# Patient Record
(demographics unavailable — no encounter records)

---

## 2024-10-08 NOTE — PHYSICAL EXAM
[Normal Mood and Affect] : normal mood and affect [Well Developed] : well developed [Well Nourished] : well nourished [NL (90)] : forward flexion 90 degrees [NL (30)] : right lateral bending 30 degrees [Disc space narrowing] : Disc space narrowing [NL (120)] : flexion 120 degrees [NL (45)] : external rotation 45 degrees [5___] : abduction 5[unfilled]/5 [Right] : right hip with pelvis [AP] : anteroposterior [Lateral] : lateral [Moderate arthritis (Tonnis Grade 2)] : Moderate arthritis (Tonnis Grade 2) [Extension] : extension [Facet arthropathy] : Facet arthropathy [Spondylolithesis] : Spondylolithesis [de-identified] : overweight [FreeTextEntry1] : Slight rightward deviation.  Old slight compression fracture L2 superior endplate.  Mild DDD. Spondylolisthesis L4-5. [] : no greater trochanteric tenderness [FreeTextEntry9] : Merna femoral head, dysplastic.  Mild joint space narrowing. [de-identified] : adduction 30 degrees [de-identified] : abduction 35 degrees [TWNoteComboBox6] : internal rotation 30 degrees

## 2024-10-08 NOTE — REASON FOR VISIT
[FreeTextEntry2] : f/u lower back. States that she is uncomfortable all the time. Walking too much or sitting for too long bothers her.

## 2024-10-08 NOTE — HISTORY OF PRESENT ILLNESS
[2] : 2 [Sharp] : sharp [Shooting] : shooting [Constant] : constant [Rest] : rest [Heat] : heat [Sitting] : sitting [Full time] : Work status: full time [de-identified] : 10/08/24:  Return visit for a 64 year old female here today for right sided lower back pain that she has been living with.  Occasional pain into her thigh, has known OA of the right hip.  Walking or sitting for too long causes pain.  She has to change positions.  Is taking Feldene once a day without relief of back pain.  Pain scale at a 4.  11/18/22  Initial visit for this 63 year female c/o spontaneous onset of lateral rt hip pain radiates to rt LB x last 6 months duration. Constant and daily. Worse arising from a sitting position and sitting in a car. Occasional wake up pain at night. pain is a "2-6". Has tried Motrin 400-600 mg prn w/o relief.  PMH: Hx of CHD rt hip had surgery age at 3 yo.           Had x-rays 10 years ago at [place] where DJD was noted. has occasional episodes of locking.          Hx left renal carcinoma. 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

## 2024-11-16 NOTE — HISTORY OF PRESENT ILLNESS
[Lower back] : lower back [Right Leg] : right leg [3] : 3 [1] : 2 [Dull/Aching] : dull/aching [Radiating] : radiating [Sharp] : sharp [Shooting] : shooting [Stabbing] : stabbing [Throbbing] : throbbing [Constant] : constant [Household chores] : household chores [Leisure] : leisure [Sleep] : sleep [Rest] : rest [Meds] : meds [Heat] : heat [Physical therapy] : physical therapy [] : no [FreeTextEntry1] : R hip  [FreeTextEntry7] : R leg  [FreeTextEntry9] : Tramadol- Acepromazine, [de-identified] : x-ray and MRI

## 2024-11-16 NOTE — DISCUSSION/SUMMARY
[de-identified] : CAMRYN GAMBOA is a 65 year-old woman presenting for a NPV for a history of chronic right hip and low back pain.   Prior treatment: Piroxicam Oral steroid taper  Plan: 1) MRI lumbar spine images reviewed with the patient. 2) Schedule L4-L5 ILESI w/o MAC. The procedure was explained to the patient in detail. Reviewed risks, benefits, and alternatives with the patient. Some risks discussed included temporary increase in pain, bleeding, infection, and side effects from steroids. The patient expressed understanding and would like to proceed. 3) Continue piroxicam 20mg daily; denies AEs 4) Discussed a variety of exercises and regular stretches and printed out various extension/flexion exercises and core strengthening exercises. Patient will start a daily stretching routine and eventually resume cardio workout. Also discussed the importance of stress reduction and good sleep hygiene. 5) RTC post procedure

## 2024-11-16 NOTE — PHYSICAL EXAM
[de-identified] : Gen: NAD Head: NC/AT Eyes: no glasses, no scleral icterus ENT: mucous membranes moist CV: No JVD Lungs: nonlabored breathing Abd: soft, NT/ND Ext: full ROM in all extremities, no peripheral edema Back: +TTP in the right low lumbar facet region, +SLR on the RIGHT Neuro: CN intact LEs +5 L +5 R hip flexion +5 L +5 R leg extension +5 L +5 R leg flexion +5 L +5 R foot dorsiflexion +5 L +5 R foot plantarflexion +5 L +5 R EHL extension Psych: normal affect Skin: no visible lesions

## 2024-11-16 NOTE — HISTORY OF PRESENT ILLNESS
[Lower back] : lower back [Right Leg] : right leg [3] : 3 [1] : 2 [Dull/Aching] : dull/aching [Radiating] : radiating [Sharp] : sharp [Shooting] : shooting [Stabbing] : stabbing [Throbbing] : throbbing [Constant] : constant [Household chores] : household chores [Leisure] : leisure [Sleep] : sleep [Rest] : rest [Meds] : meds [Heat] : heat [Physical therapy] : physical therapy [] : no [FreeTextEntry1] : R hip  [FreeTextEntry7] : R leg  [FreeTextEntry9] : Tramadol- Acepromazine, [de-identified] : x-ray and MRI

## 2024-11-16 NOTE — PHYSICAL EXAM
[de-identified] : Gen: NAD Head: NC/AT Eyes: no glasses, no scleral icterus ENT: mucous membranes moist CV: No JVD Lungs: nonlabored breathing Abd: soft, NT/ND Ext: full ROM in all extremities, no peripheral edema Back: +TTP in the right low lumbar facet region, +SLR on the RIGHT Neuro: CN intact LEs +5 L +5 R hip flexion +5 L +5 R leg extension +5 L +5 R leg flexion +5 L +5 R foot dorsiflexion +5 L +5 R foot plantarflexion +5 L +5 R EHL extension Psych: normal affect Skin: no visible lesions

## 2024-11-16 NOTE — DATA REVIEWED
[MRI] : MRI [Lumbar Spine] : lumbar spine [Report was reviewed and noted in the chart] : The report was reviewed and noted in the chart [I independently reviewed and interpreted images and report] : I independently reviewed and interpreted images and report [FreeTextEntry1] : 10/23/2024 MRI Lumbar Spine ([facility]) Findings: Five lumbar type vertebral bodies are presumed for the purposes of this report with the last formed intervertebral disc considered L5-S1. Lordosis is preserved without subluxation. There is a mild chronic compression deformity at L2. The remaining vertebral bodies are normal in height without evidence for fracture or destructive osseous lesion. Multilevel degenerative disc disease is appreciated with multilevel disc height loss and desiccation. There is no pars defect. The conus resides at L1. The patient is status post partial left nephrectomy. A right renal cyst is appreciated.  Axial levels:  T12-L1: There is no disc herniation, central canal stenosis, or neural foraminal narrowing.  L1-L2: There is a diffuse disc bulge and bilateral facet arthrosis. There is no neural foraminal narrowing or central canal stenosis.  L2-L3: There is no disc herniation, central canal stenosis, or neural foraminal narrowing.  L3-L4: There is no disc herniation, central canal stenosis, or neural foraminal narrowing.  L4-L5: There is a diffuse disc bulge and bilateral facet arthrosis. There is a small central disc protrusion. There is mild neural foraminal narrowing and mild central canal stenosis.  L5-S1: There is no disc herniation, central canal stenosis, or neural foraminal narrowing.  IMPRESSION: * Mild chronic compression deformity at L2. * Multilevel degenerative disc disease and facet arthrosis without nerve root encroachment or significant central canal stenosis.

## 2024-11-16 NOTE — DISCUSSION/SUMMARY
[de-identified] : CAMRYN GAMBOA is a 65 year-old woman presenting for a NPV for a history of chronic right hip and low back pain.   Prior treatment: Piroxicam Oral steroid taper  Plan: 1) MRI lumbar spine images reviewed with the patient. 2) Schedule L4-L5 ILESI w/o MAC. The procedure was explained to the patient in detail. Reviewed risks, benefits, and alternatives with the patient. Some risks discussed included temporary increase in pain, bleeding, infection, and side effects from steroids. The patient expressed understanding and would like to proceed. 3) Continue piroxicam 20mg daily; denies AEs 4) Discussed a variety of exercises and regular stretches and printed out various extension/flexion exercises and core strengthening exercises. Patient will start a daily stretching routine and eventually resume cardio workout. Also discussed the importance of stress reduction and good sleep hygiene. 5) RTC post procedure

## 2024-12-12 NOTE — PROCEDURE
[FreeTextEntry1] : L4-L5 lumbar interlaminar epidural steroid injection [FreeTextEntry2] : chronic lumbar radiculopathy [FreeTextEntry3] : Procedure Date: 12/12/2024   Preoperative Diagnosis: chronic low back pain with bilateral sciatica   Procedure: L4-L5 epidural steroid injection under fluoroscopic guidance   Anesthesia: local   Complications: none   EBL: none   Procedure in detail: Patient was seen and examined. Risks, benefits, and alternatives for the procedure were discussed with the patient in detail. The patient expressed understanding, gave written and verbal consent, and placed themselves in a prone position on the procedure table. Skin overlying the lumbosacral spine was prepped with chloraprep and draped in the usual sterile fashion. Fluoroscopic images were obtained to identify the L4 and L5 vertebral body. Target was the interlaminar space between the L4 and L5 vertebral body. Skin overlying the target was marked and infiltrated with 1% lidocaine. Using an 20 gauge, 9cm Tuohy needle, this was inserted and advanced under intermittent fluoroscopic guidance. When felt to be engaged in the epidural space, lateral view was used to confirm depth. Needle was advanced until loss of resistance to saline was achieved. After negative aspiration for heme/csf, contrast was injected under live fluoroscopy, which showed contrast spread consistent with epidural flow. No evidence of intravascular or intrathecal uptake. At this point, a total of 3ml of injectate, which consisted of 1ml of 80mg/ml depomedrol and 2ml of normal saline were injected. The needle was restyletted and withdrawn, a band aid was placed over the injection site. Patient tolerated the procedure well. The patient recovered uneventfully and was discharged home in stable condition.

## 2025-01-08 NOTE — PHYSICAL EXAM
[de-identified] : Gen: NAD Head: NC/AT Eyes: no glasses, no scleral icterus ENT: mucous membranes moist CV: No JVD Lungs: nonlabored breathing Abd: soft, NT/ND Ext: RLE: +TTP over the greater trochanter  Back: +TTP in the right low lumbar facet region, +SLR on the RIGHT Neuro: CN intact LEs +5 L +5 R hip flexion +5 L +5 R leg extension +5 L +5 R leg flexion +5 L +5 R foot dorsiflexion +5 L +5 R foot plantarflexion +5 L +5 R EHL extension Psych: normal affect Skin: no visible lesions

## 2025-01-08 NOTE — PHYSICAL EXAM
[de-identified] : Gen: NAD Head: NC/AT Eyes: no glasses, no scleral icterus ENT: mucous membranes moist CV: No JVD Lungs: nonlabored breathing Abd: soft, NT/ND Ext: RLE: +TTP over the greater trochanter  Back: +TTP in the right low lumbar facet region, +SLR on the RIGHT Neuro: CN intact LEs +5 L +5 R hip flexion +5 L +5 R leg extension +5 L +5 R leg flexion +5 L +5 R foot dorsiflexion +5 L +5 R foot plantarflexion +5 L +5 R EHL extension Psych: normal affect Skin: no visible lesions

## 2025-01-08 NOTE — DISCUSSION/SUMMARY
[de-identified] : CAMRYN GAMBOA is a 65 year-old woman presenting for a RPV for a history of chronic right hip and low back pain.   Prior treatment: 12/12/2024 L4-L5 ILESI w/o MAC w/ 80% improvement of pain and function  Piroxicam Oral steroid taper  Plan: 1) MRI lumbar spine images reviewed with the patient. 2) Consider repeat L4-L5 ILESI w/o MAC in the future 3) Continue piroxicam 20mg daily; denies AEs 4) RIGHT GTB injection today. The procedure was explained to the patient in detail. Reviewed risks, benefits, and alternatives with the patient. Some risks discussed included temporary increase in pain, bleeding, infection, and side effects from steroids. The patient expressed understanding and would like to proceed. 5) RTC 2 months

## 2025-01-08 NOTE — PROCEDURE
[Large Joint Injection] : Large joint injection [Right] : of the right [Greater Trochanteric Bursa] : greater trochanteric bursa [Pain] : pain [Alcohol] : alcohol [Ethyl Chloride sprayed topically] : ethyl chloride sprayed topically [___ cc    0.25%] : Bupivacaine (Marcaine) ~Vcc of 0.25%  [___ cc    40mg] : Triamcinolone (Kenalog) ~Vcc of 40 mg  [] : Patient tolerated procedure well [Risks, benefits, alternatives discussed / Verbal consent obtained] : the risks benefits, and alternatives have been discussed, and verbal consent was obtained

## 2025-01-08 NOTE — HISTORY OF PRESENT ILLNESS
[Lower back] : lower back [Right Leg] : right leg [3] : 3 [Dull/Aching] : dull/aching [Radiating] : radiating [Sharp] : sharp [Shooting] : shooting [Stabbing] : stabbing [Throbbing] : throbbing [Constant] : constant [Household chores] : household chores [Leisure] : leisure [Sleep] : sleep [Rest] : rest [Meds] : meds [Heat] : heat [Physical therapy] : physical therapy [1] : 2 [] : no [FreeTextEntry1] : R hip  [FreeTextEntry7] : R leg  [FreeTextEntry9] : Tramadol- Acepromazine, [de-identified] : x-ray and MRI [TWNoteComboBox1] : 80%

## 2025-01-08 NOTE — DISCUSSION/SUMMARY
[de-identified] : CAMRYN GAMBOA is a 65 year-old woman presenting for a RPV for a history of chronic right hip and low back pain.   Prior treatment: 12/12/2024 L4-L5 ILESI w/o MAC w/ 80% improvement of pain and function  Piroxicam Oral steroid taper  Plan: 1) MRI lumbar spine images reviewed with the patient. 2) Consider repeat L4-L5 ILESI w/o MAC in the future 3) Continue piroxicam 20mg daily; denies AEs 4) RIGHT GTB injection today. The procedure was explained to the patient in detail. Reviewed risks, benefits, and alternatives with the patient. Some risks discussed included temporary increase in pain, bleeding, infection, and side effects from steroids. The patient expressed understanding and would like to proceed. 5) RTC 2 months

## 2025-01-08 NOTE — HISTORY OF PRESENT ILLNESS
[Lower back] : lower back [Right Leg] : right leg [3] : 3 [Dull/Aching] : dull/aching [Radiating] : radiating [Sharp] : sharp [Shooting] : shooting [Stabbing] : stabbing [Throbbing] : throbbing [Constant] : constant [Household chores] : household chores [Leisure] : leisure [Sleep] : sleep [Rest] : rest [Meds] : meds [Heat] : heat [Physical therapy] : physical therapy [1] : 2 [] : no [FreeTextEntry1] : R hip  [FreeTextEntry7] : R leg  [FreeTextEntry9] : Tramadol- Acepromazine, [de-identified] : x-ray and MRI [TWNoteComboBox1] : 80%

## 2025-01-25 NOTE — ASSESSMENT
[FreeTextEntry1] : Patient presents for followup of multiple complex gastrointestinal signs and symptoms.   Multiple reports were reviewed with patient including reports of colonoscopy, endoscopy and histopathology.   New prescriptions sent to pharmacy for glycopyrrolate and rabeprazole.   Moderate degree of complexity of medical decision making involved in this patient encounter

## 2025-01-25 NOTE — HISTORY OF PRESENT ILLNESS
[FreeTextEntry1] : Chief complaint: abdominal pain, gerd   HPI Patient presents for followup of multiple complex gastrointestinal signs and symptoms. Complex syndrome of abdominal pain with intermittent crampy llq abdominal pain. There are no exacerbating or ameliorating factors. The abdominal pain is non radiating; associated factors include nausea.   No sign of acute gastrointestinal bleeding such as hematemesis, melena or hematochezia no dysphagia or odynophagia.   Will increase dose of glycopyrrolate to one mg tid  Prescription to be sent for rabeprazole 20 mg po qd Moderate degree of complexity of medical decision making involved in this patient encounter

## 2025-01-25 NOTE — PHYSICAL EXAM

## 2025-03-11 NOTE — PROCEDURE
[Large Joint Injection] : Large joint injection [Right] : of the right [Greater Trochanteric Bursa] : greater trochanteric bursa [Pain] : pain [Alcohol] : alcohol [Ethyl Chloride sprayed topically] : ethyl chloride sprayed topically [___ cc    0.25%] : Bupivacaine (Marcaine) ~Vcc of 0.25%  [___ cc    40mg] : Triamcinolone (Kenalog) ~Vcc of 40 mg  [Risks, benefits, alternatives discussed / Verbal consent obtained] : the risks benefits, and alternatives have been discussed, and verbal consent was obtained [] : Patient tolerated procedure well

## 2025-03-11 NOTE — REASON FOR VISIT
Anesthesia Evaluation     Patient summary reviewed and Nursing notes reviewed   no history of anesthetic complications:  NPO Solid Status: > 8 hours  NPO Liquid Status: > 2 hours           Airway   Mallampati: III  TM distance: >3 FB  Neck ROM: full  Possible difficult intubation  Dental      Comment: A FEW MISSING TEETH    Pulmonary    (+) asthma,decreased breath sounds,   Cardiovascular   Exercise tolerance: good (4-7 METS)    ECG reviewed  Rhythm: regular  Rate: normal    (+) hyperlipidemia,       Neuro/Psych  GI/Hepatic/Renal/Endo    (+) obesity,   diabetes mellitus type 2 well controlled,     Musculoskeletal     Abdominal   (+) obese,     Abdomen: soft.   Substance History      OB/GYN          Other   arthritis,                      Anesthesia Plan    ASA 3     general     intravenous induction     Anesthetic plan, all risks, benefits, and alternatives have been provided, discussed and informed consent has been obtained with: patient.    Plan discussed with CRNA.      
[Follow-Up Visit] : a follow-up pain management visit
[Follow-Up Visit] : a follow-up pain management visit

## 2025-03-12 NOTE — HISTORY OF PRESENT ILLNESS
[Lower back] : lower back [Right Leg] : right leg [1] : 2 [Dull/Aching] : dull/aching [Radiating] : radiating [Sharp] : sharp [Shooting] : shooting [Stabbing] : stabbing [Throbbing] : throbbing [Constant] : constant [Household chores] : household chores [Leisure] : leisure [Sleep] : sleep [Rest] : rest [Meds] : meds [Heat] : heat [Physical therapy] : physical therapy [8] : 8 [5] : 5 [] : no [FreeTextEntry1] : R hip  [FreeTextEntry7] : R leg  [FreeTextEntry9] : Tramadol- Acepromazine, [de-identified] : x-ray and MRI [TWNoteComboBox1] : 80%

## 2025-03-12 NOTE — DISCUSSION/SUMMARY
[de-identified] : CAMRYN GAMBOA is a 65 year-old woman presenting for a RPV for a history of chronic right hip and low back pain.   Prior treatment: 12/12/2024 L4-L5 ILESI w/o MAC w/ 80% improvement of pain and function  Piroxicam Oral steroid taper  Plan: 1) MRI lumbar spine images reviewed with the patient. 2) Consider repeat L4-L5 ILESI w/o MAC in the future 3) Continue piroxicam 20mg daily; denies AEs 4) X-ray right knee images reviewed with the patient - patient with mild degenerative changes, OA in right hip is much worse 5) Schedule CSI right hip under x-ray guidance w/o sedation. The procedure was explained to the patient in detail. Reviewed risks, benefits, and alternatives with the patient. Some risks discussed included temporary increase in pain, bleeding, infection, and side effects from steroids. The patient expressed understanding and would like to proceed. 6) RTC post procedure

## 2025-03-12 NOTE — PHYSICAL EXAM
[de-identified] : Gen: NAD Head: NC/AT Eyes: no glasses, no scleral icterus ENT: mucous membranes moist CV: No JVD Lungs: nonlabored breathing Abd: soft, NT/ND Ext: RLE: limited internal/external hip rotation  Back: +TTP in the right low lumbar facet region, +SLR on the RIGHT Neuro: CN intact LEs +5 L +5 R hip flexion +5 L +5 R leg extension +5 L +5 R leg flexion +5 L +5 R foot dorsiflexion +5 L +5 R foot plantarflexion +5 L +5 R EHL extension Psych: normal affect Skin: no visible lesions

## 2025-03-12 NOTE — HISTORY OF PRESENT ILLNESS
[Lower back] : lower back [Right Leg] : right leg [1] : 2 [Dull/Aching] : dull/aching [Radiating] : radiating [Sharp] : sharp [Shooting] : shooting [Stabbing] : stabbing [Throbbing] : throbbing [Constant] : constant [Household chores] : household chores [Leisure] : leisure [Sleep] : sleep [Rest] : rest [Meds] : meds [Heat] : heat [Physical therapy] : physical therapy [8] : 8 [5] : 5 [] : no [FreeTextEntry1] : R hip  [FreeTextEntry7] : R leg  [FreeTextEntry9] : Tramadol- Acepromazine, [de-identified] : x-ray and MRI [TWNoteComboBox1] : 80%

## 2025-03-12 NOTE — DISCUSSION/SUMMARY
[de-identified] : CAMRYN GAMBOA is a 65 year-old woman presenting for a RPV for a history of chronic right hip and low back pain.   Prior treatment: 12/12/2024 L4-L5 ILESI w/o MAC w/ 80% improvement of pain and function  Piroxicam Oral steroid taper  Plan: 1) MRI lumbar spine images reviewed with the patient. 2) Consider repeat L4-L5 ILESI w/o MAC in the future 3) Continue piroxicam 20mg daily; denies AEs 4) X-ray right knee images reviewed with the patient - patient with mild degenerative changes, OA in right hip is much worse 5) Schedule CSI right hip under x-ray guidance w/o sedation. The procedure was explained to the patient in detail. Reviewed risks, benefits, and alternatives with the patient. Some risks discussed included temporary increase in pain, bleeding, infection, and side effects from steroids. The patient expressed understanding and would like to proceed. 6) RTC post procedure

## 2025-03-12 NOTE — PHYSICAL EXAM
[de-identified] : Gen: NAD Head: NC/AT Eyes: no glasses, no scleral icterus ENT: mucous membranes moist CV: No JVD Lungs: nonlabored breathing Abd: soft, NT/ND Ext: RLE: limited internal/external hip rotation  Back: +TTP in the right low lumbar facet region, +SLR on the RIGHT Neuro: CN intact LEs +5 L +5 R hip flexion +5 L +5 R leg extension +5 L +5 R leg flexion +5 L +5 R foot dorsiflexion +5 L +5 R foot plantarflexion +5 L +5 R EHL extension Psych: normal affect Skin: no visible lesions

## 2025-04-23 NOTE — HISTORY OF PRESENT ILLNESS
[Lower back] : lower back [Right Leg] : right leg [8] : 8 [5] : 5 [Dull/Aching] : dull/aching [Radiating] : radiating [Sharp] : sharp [Shooting] : shooting [Stabbing] : stabbing [Throbbing] : throbbing [Constant] : constant [Household chores] : household chores [Leisure] : leisure [Sleep] : sleep [Rest] : rest [Meds] : meds [Heat] : heat [Physical therapy] : physical therapy [3] : 3 [] : no [FreeTextEntry7] : R leg  [FreeTextEntry1] : R hip  [FreeTextEntry9] : Tramadol- Acepromazine, [de-identified] : x-ray and MRI [TWNoteComboBox1] : 20%

## 2025-04-23 NOTE — PROCEDURE
[Large Joint Injection] : Large joint injection [Right] : of the right [Greater Trochanteric Bursa] : greater trochanteric bursa [Pain] : pain [Alcohol] : alcohol [Ethyl Chloride sprayed topically] : ethyl chloride sprayed topically [___ cc    0.25%] : Bupivacaine (Marcaine) ~Vcc of 0.25%  [___ cc    40mg] : Triamcinolone (Kenalog) ~Vcc of 40 mg

## 2025-04-23 NOTE — DISCUSSION/SUMMARY
[de-identified] : CAMRYN GAMBOA is a 65 year-old woman presenting for a RPV for a history of chronic right hip and low back pain.   Prior treatment: 04/04/2025 RIGHT IA HIP injection w/o MAC 12/12/2024 L4-L5 ILESI w/o MAC w/ 80% improvement of pain and function  Piroxicam Oral steroid taper  Plan: 1) MRI lumbar spine images reviewed with the patient. 2) Consider repeat L4-L5 ILESI w/o MAC in the future 3) Continue piroxicam 20mg daily; denies AEs 4) X-ray right knee images reviewed with the patient - patient with mild degenerative changes, OA in right hip is much worse 5) RIGHT GTB injection. The procedure was explained to the patient in detail. Reviewed risks, benefits, and alternatives with the patient. Some risks discussed included temporary increase in pain, bleeding, infection, and side effects from steroids. The patient expressed understanding and would like to proceed. 6) Obtain MRI right thigh w/o contrast  7) Initiate physical therapy - script provided 6) RTC 6 weeks

## 2025-04-23 NOTE — HISTORY OF PRESENT ILLNESS
[Lower back] : lower back [Right Leg] : right leg [8] : 8 [5] : 5 [Dull/Aching] : dull/aching [Radiating] : radiating [Sharp] : sharp [Shooting] : shooting [Stabbing] : stabbing [Throbbing] : throbbing [Constant] : constant [Household chores] : household chores [Leisure] : leisure [Sleep] : sleep [Rest] : rest [Meds] : meds [Heat] : heat [Physical therapy] : physical therapy [3] : 3 [] : no [FreeTextEntry1] : R hip  [FreeTextEntry7] : R leg  [FreeTextEntry9] : Tramadol- Acepromazine, [de-identified] : x-ray and MRI [TWNoteComboBox1] : 20%

## 2025-04-23 NOTE — PHYSICAL EXAM
[de-identified] : Gen: NAD Head: NC/AT Eyes: no glasses, no scleral icterus ENT: mucous membranes moist CV: No JVD Lungs: nonlabored breathing Abd: soft, NT/ND Ext: RLE: limited internal/external hip rotation  Back: +TTP in the right low lumbar facet region, +SLR on the RIGHT Neuro: CN intact LEs +5 L +5 R hip flexion +5 L +5 R leg extension +5 L +5 R leg flexion +5 L +5 R foot dorsiflexion +5 L +5 R foot plantarflexion +5 L +5 R EHL extension Psych: normal affect Skin: no visible lesions

## 2025-04-23 NOTE — PHYSICAL EXAM
[de-identified] : Gen: NAD Head: NC/AT Eyes: no glasses, no scleral icterus ENT: mucous membranes moist CV: No JVD Lungs: nonlabored breathing Abd: soft, NT/ND Ext: RLE: limited internal/external hip rotation  Back: +TTP in the right low lumbar facet region, +SLR on the RIGHT Neuro: CN intact LEs +5 L +5 R hip flexion +5 L +5 R leg extension +5 L +5 R leg flexion +5 L +5 R foot dorsiflexion +5 L +5 R foot plantarflexion +5 L +5 R EHL extension Psych: normal affect Skin: no visible lesions

## 2025-04-23 NOTE — DISCUSSION/SUMMARY
[de-identified] : CAMRYN GAMBOA is a 65 year-old woman presenting for a RPV for a history of chronic right hip and low back pain.   Prior treatment: 04/04/2025 RIGHT IA HIP injection w/o MAC 12/12/2024 L4-L5 ILESI w/o MAC w/ 80% improvement of pain and function  Piroxicam Oral steroid taper  Plan: 1) MRI lumbar spine images reviewed with the patient. 2) Consider repeat L4-L5 ILESI w/o MAC in the future 3) Continue piroxicam 20mg daily; denies AEs 4) X-ray right knee images reviewed with the patient - patient with mild degenerative changes, OA in right hip is much worse 5) RIGHT GTB injection. The procedure was explained to the patient in detail. Reviewed risks, benefits, and alternatives with the patient. Some risks discussed included temporary increase in pain, bleeding, infection, and side effects from steroids. The patient expressed understanding and would like to proceed. 6) Obtain MRI right thigh w/o contrast  7) Initiate physical therapy - script provided 6) RTC 6 weeks

## 2025-06-04 NOTE — PHYSICAL EXAM
[de-identified] : Gen: NAD Head: NC/AT Eyes: no glasses, no scleral icterus ENT: mucous membranes moist CV: No JVD Lungs: nonlabored breathing Abd: soft, NT/ND Ext: RLE: limited internal/external hip rotation  Back: +TTP in the right low lumbar facet region, +SLR on the RIGHT Neuro: CN intact LEs +5 L +5 R hip flexion +5 L +5 R leg extension +5 L +5 R leg flexion +5 L +5 R foot dorsiflexion +5 L +5 R foot plantarflexion +5 L +5 R EHL extension Psych: normal affect Skin: no visible lesions

## 2025-06-04 NOTE — DATA REVIEWED
[Lumbar Spine] : lumbar spine [MRI] : MRI [Right] : of the right [Hip] : hip [Report was reviewed and noted in the chart] : The report was reviewed and noted in the chart [I independently reviewed and interpreted images and report] : I independently reviewed and interpreted images and report [FreeTextEntry1] : 4/28/2025 MRI RIGHT hip ([facility]) FINDINGS: Some of the images are motion degraded, limiting the sensitivity of the examination. There is no acute fracture or dislocation.  Hip: Severe osteoarthritis is characterized by broad full-thickness cartilage loss overlying the superior aspect of the joint with joint space narrowing and bone-on-bone apposition. There is flattening and remodeling of the articular surface with marked reactive subchondral edema and cystic change. Marginal spurring is noted about the acetabulum and there is collar osteophyte formation. The superior labrum is diffusely degenerated and torn extending into the anterior labrum where there is mucinous degeneration. A very small joint effusion is noted with synovitis/debris.  Tendons/muscles: The origin of the hamstring and insertions of the iliopsoas and gluteal tendons are intact. There is no greater trochanteric or iliopsoas bursitis. No disproportionate muscle atrophy or intramuscular edema is identified.  ----- Page Break ----- Nerves: The fat plane surrounding the right sciatic nerve is maintained.  Pelvis and contralateral hip: Large field-of-view images of the entire pelvis demonstrate intact sacroiliac joints and pubic symphysis. There is no advanced left hip osteoarthritis or joint effusion. Fluid distention of the left greater trochanteric bursa is suggested.  Miscellaneous: The right hip periarticular subcutaneous tissues are unremarkable.  IMPRESSION: Severe RIGHT hip osteoarthritis with broad full-thickness cartilage loss and bone-on-bone apposition. Joint effusion with synovitis/debris.  LEFT greater trochanteric bursitis partially visualized.  10/23/2024 MRI Lumbar Spine ([facility]) Findings: Five lumbar type vertebral bodies are presumed for the purposes of this report with the last formed intervertebral disc considered L5-S1. Lordosis is preserved without subluxation. There is a mild chronic compression deformity at L2. The remaining vertebral bodies are normal in height without evidence for fracture or destructive osseous lesion. Multilevel degenerative disc disease is appreciated with multilevel disc height loss and desiccation. There is no pars defect. The conus resides at L1. The patient is status post partial left nephrectomy. A right renal cyst is appreciated.  Axial levels:  T12-L1: There is no disc herniation, central canal stenosis, or neural foraminal narrowing.  L1-L2: There is a diffuse disc bulge and bilateral facet arthrosis. There is no neural foraminal narrowing or central canal stenosis.  L2-L3: There is no disc herniation, central canal stenosis, or neural foraminal narrowing.  L3-L4: There is no disc herniation, central canal stenosis, or neural foraminal narrowing.  L4-L5: There is a diffuse disc bulge and bilateral facet arthrosis. There is a small central disc protrusion. There is mild neural foraminal narrowing and mild central canal stenosis.  L5-S1: There is no disc herniation, central canal stenosis, or neural foraminal narrowing.  IMPRESSION: * Mild chronic compression deformity at L2. * Multilevel degenerative disc disease and facet arthrosis without nerve root encroachment or significant central canal stenosis.

## 2025-06-04 NOTE — HISTORY OF PRESENT ILLNESS
[Lower back] : lower back [Right Leg] : right leg [3] : 3 [Dull/Aching] : dull/aching [Radiating] : radiating [Sharp] : sharp [Shooting] : shooting [Stabbing] : stabbing [Throbbing] : throbbing [Constant] : constant [Household chores] : household chores [Leisure] : leisure [Sleep] : sleep [Rest] : rest [Meds] : meds [Heat] : heat [Physical therapy] : physical therapy [5] : 5 [2] : 2 [] : no [FreeTextEntry1] : R hip  [FreeTextEntry7] : R leg  [FreeTextEntry9] : Tramadol- Acepromazine, [de-identified] : x-ray and MRI [TWNoteComboBox1] : 20%

## 2025-06-04 NOTE — HISTORY OF PRESENT ILLNESS
[Lower back] : lower back [Right Leg] : right leg [3] : 3 [Dull/Aching] : dull/aching [Radiating] : radiating [Sharp] : sharp [Shooting] : shooting [Stabbing] : stabbing [Throbbing] : throbbing [Constant] : constant [Household chores] : household chores [Leisure] : leisure [Sleep] : sleep [Rest] : rest [Meds] : meds [Heat] : heat [Physical therapy] : physical therapy [5] : 5 [2] : 2 [] : no [FreeTextEntry1] : R hip  [FreeTextEntry7] : R leg  [FreeTextEntry9] : Tramadol- Acepromazine, [de-identified] : x-ray and MRI [TWNoteComboBox1] : 20%

## 2025-06-04 NOTE — PHYSICAL EXAM
[de-identified] : Gen: NAD Head: NC/AT Eyes: no glasses, no scleral icterus ENT: mucous membranes moist CV: No JVD Lungs: nonlabored breathing Abd: soft, NT/ND Ext: RLE: limited internal/external hip rotation  Back: +TTP in the right low lumbar facet region, +SLR on the RIGHT Neuro: CN intact LEs +5 L +5 R hip flexion +5 L +5 R leg extension +5 L +5 R leg flexion +5 L +5 R foot dorsiflexion +5 L +5 R foot plantarflexion +5 L +5 R EHL extension Psych: normal affect Skin: no visible lesions

## 2025-06-04 NOTE — DISCUSSION/SUMMARY
[de-identified] : CAMRYN GAMBOA is a 65 year-old woman presenting for a RPV for a history of chronic right hip and low back pain.   Prior treatment: 4/4/2025 RIGHT IA HIP injection w/o MAC w/ 50% improvement  12/12/2024 L4-L5 ILESI w/o MAC w/ 80% improvement of pain and function  Piroxicam Oral steroid taper  Plan: 1) MRI lumbar spine images reviewed with the patient. 2) Consider repeat L4-L5 ILESI w/o MAC in the future 3) Trial diclofenac 75mg BID prn; Discussed the risks of NSAIDs, including worsening HTN, cardiovascular risks, GI risk, renal injury. The patient was told not to take other NSAIDs with this and to consult with their PCP if there is a significant medical history to warrant this prior to initiating treatment. 4) MRI right hip reviewed with the patient - patient with severe OA 5) Referral to ortho - joint to consider right RICARDO 6) Continue physical therapy  7) RTC prn

## 2025-06-04 NOTE — PHYSICAL EXAM
[de-identified] : Gen: NAD Head: NC/AT Eyes: no glasses, no scleral icterus ENT: mucous membranes moist CV: No JVD Lungs: nonlabored breathing Abd: soft, NT/ND Ext: RLE: limited internal/external hip rotation  Back: +TTP in the right low lumbar facet region, +SLR on the RIGHT Neuro: CN intact LEs +5 L +5 R hip flexion +5 L +5 R leg extension +5 L +5 R leg flexion +5 L +5 R foot dorsiflexion +5 L +5 R foot plantarflexion +5 L +5 R EHL extension Psych: normal affect Skin: no visible lesions

## 2025-06-04 NOTE — DISCUSSION/SUMMARY
[de-identified] : CAMRYN GAMBOA is a 65 year-old woman presenting for a RPV for a history of chronic right hip and low back pain.   Prior treatment: 4/4/2025 RIGHT IA HIP injection w/o MAC w/ 50% improvement  12/12/2024 L4-L5 ILESI w/o MAC w/ 80% improvement of pain and function  Piroxicam Oral steroid taper  Plan: 1) MRI lumbar spine images reviewed with the patient. 2) Consider repeat L4-L5 ILESI w/o MAC in the future 3) Trial diclofenac 75mg BID prn; Discussed the risks of NSAIDs, including worsening HTN, cardiovascular risks, GI risk, renal injury. The patient was told not to take other NSAIDs with this and to consult with their PCP if there is a significant medical history to warrant this prior to initiating treatment. 4) MRI right hip reviewed with the patient - patient with severe OA 5) Referral to ortho - joint to consider right RICARDO 6) Continue physical therapy  7) RTC prn

## 2025-06-04 NOTE — DISCUSSION/SUMMARY
[de-identified] : CAMRYN GAMBOA is a 65 year-old woman presenting for a RPV for a history of chronic right hip and low back pain.   Prior treatment: 4/4/2025 RIGHT IA HIP injection w/o MAC w/ 50% improvement  12/12/2024 L4-L5 ILESI w/o MAC w/ 80% improvement of pain and function  Piroxicam Oral steroid taper  Plan: 1) MRI lumbar spine images reviewed with the patient. 2) Consider repeat L4-L5 ILESI w/o MAC in the future 3) Trial diclofenac 75mg BID prn; Discussed the risks of NSAIDs, including worsening HTN, cardiovascular risks, GI risk, renal injury. The patient was told not to take other NSAIDs with this and to consult with their PCP if there is a significant medical history to warrant this prior to initiating treatment. 4) MRI right hip reviewed with the patient - patient with severe OA 5) Referral to ortho - joint to consider right RICARDO 6) Continue physical therapy  7) RTC prn

## 2025-06-04 NOTE — HISTORY OF PRESENT ILLNESS
[Lower back] : lower back [Right Leg] : right leg [3] : 3 [Dull/Aching] : dull/aching [Radiating] : radiating [Sharp] : sharp [Shooting] : shooting [Stabbing] : stabbing [Throbbing] : throbbing [Constant] : constant [Household chores] : household chores [Leisure] : leisure [Sleep] : sleep [Rest] : rest [Meds] : meds [Heat] : heat [Physical therapy] : physical therapy [5] : 5 [2] : 2 [] : no [FreeTextEntry1] : R hip  [FreeTextEntry9] : Tramadol- Acepromazine, [FreeTextEntry7] : R leg  [de-identified] : x-ray and MRI [TWNoteComboBox1] : 20%

## 2025-06-27 NOTE — DISCUSSION/SUMMARY
[de-identified] : I, Gaviota Marroquin, am scribing for Dr. Torres in his presence for the chief complaint, physical exam, studies, assessment, and/or plan.

## 2025-06-27 NOTE — ASSESSMENT
[FreeTextEntry1] : 65 year F WITH MODERATE RT HIP PAIN. PAIN IS IN THE GROIN AND DOES NOT RADIATE. HAD RT HIP IA INJECTION 4/4/25 WITH RELIEF FOR A FEW DAYS. PAIN WORSENS WITH WALKING PROLONGED DISTANCES. PAIN IS AFFECTING ADL AND FUNCTIONAL ACTIVITIES, PUTTING ON SHOES AND SOCKS. XRAYS FROM 8/6/24 REVIEWED WITH ADVANCED OA, DYSPLASIA. TREATMENT OPTIONS REVIEWED. QUESTIONS ANSWERED. RT RICARDO DISCUSSED AT LENGTH. SHE WOULD LIKE TO SCHEDULE THIS FOR SEPTEMBER.  PMHx: HTN, H/O KIDNEY CA, GERD, IBS,  NO METAL ALLERGIES NO H/O DM NO H/O DVT/PE NO H/O MRSA/VRSA  RT RICARDO -   The patient was advised of the diagnosis. The natural history of the pathology was explained in full to the patient in layman's terms. All questions were answered. The risks and benefits of surgical and non-surgical treatment alternatives were explained in full to the patient.   We discussed my findings and the natural history of their condition. We talked about the details of the proposed surgery and the recovery. We discussed the material risks, possible benefits and alternatives to surgery. The risks include but are not limited to infection, bleeding and possible need for blood transfusion, fracture, bowel blockage, bladder retention or infection, need for reoperation, stiffness and/or limited range of motion, possible damage to nerves and blood vessels, failure of fixation of components, risk of deep vein thromboses and pulmonary embolism, wound healing problems, dislocation, and possible leg length discrepancy. Although incredibly rare, we also discussed the risks of a cardiac event, stroke and even death during, or following, the surgery. We discussed the type of implants the patient will be receiving and the type of fixation that will be used, as well as whether a robot or computer navigation aide will be used. The patient understands they will need medical clearance and will attend a preoperative joint education class. We also discussed the type of anesthesia they will receive, and the risks associated with hospital or rehab length of stay, obesity, diabetes and smoking.   Patient Complains of pain in the affected joint with a level that often reaches greater than an 8/10. The pain has been progressively worsening throughout his/her treatment course. The pain has interfered with their ADLs and worsens with weight bearing. On exam they often have episodes of swelling/effusion with limited ROM. Pain worsens with ROM passive and active and I can palpate crepitus.   X- rays were reviewed with the patient and they show joint space narrowing, subchondral sclerosis, osteophyte formation, and subchondral cysts. After a period of more than 12 weeks physical therapy or exercise program done with me or another treating physician they have continued pain. The patient has failed a trial of NSAID medication or pain relievers if they were unable to tolerate NSAID medications as well as a series of injections, steroids, or hyaluronic acid. After a long discussion with the patient both the patient and I have decided we have exhausted all forms of less radical treatments, and they would like to proceed with Total Joint Replacement.   Patient will plan to schedule surgery. Patient requires rolling walker and 3-in-1 commode S/P surgery. Patient currently has limited mobility that significantly impairs their ability to participate in one or more mobility related activities of daily living in the home. The patient is able to safely use the walker, and the functional mobility deficit can be sufficiently resolved with the use of a walker. Patient will also be confined to one level of the home environment and there is no toilet on that level. Requires 3-in-1 commode for bedside use as patient cannot access bathroom safely in their home in timely manner. Will order rolling walker and 3-in-1 commode.

## 2025-06-27 NOTE — HISTORY OF PRESENT ILLNESS
[de-identified] : 06/27/25: Grisel is a 65-year-old female presenting today for right hip pain. Has tried mulitple pain management injections with only temporary relief, here to discuss RICARDO.  [] : no [de-identified] : 6/4/25 [de-identified] : Dr. Hammond [de-identified] : 4/4/2025 [de-identified] : Right hip  [de-identified] : IA HIP injection w/o MAC [TWNoteComboBox1] : 20%